# Patient Record
(demographics unavailable — no encounter records)

---

## 2024-10-16 NOTE — PHYSICAL EXAM

## 2024-10-16 NOTE — ASSESSMENT
[FreeTextEntry1] : 58 yo female for screening colonoscopy . Last colonoscopy 2019. No family hx of colon cancer screening. Due for mammograms.Hx of gastric bypass. Had endoscopic revision recently with Dr. ADAM Foreman.She denies cp, sob. THere is no change in bowel habits.  IMP:61 yo female for evaluation of alp . Last colonoscopy 2019. No family hx of colon cancer screening. Due for mammograms.Hx of gastric bypass. Had endoscopic revision recently with Dr. ADAM Foreman.She denies cp, sob. THere is no change in bowel habits. 2/24- , normal alt, ast.  RTO 10/16/24- alp 191, c/w 144 in 2/24. GGT repeatedly NORMAL. Had normal bone density 2022 201- had NM bone spect which showed mild increase in mandible degenerative changes Hx of meniscal tear, on MRI 2021. Knee sometimes swells.  Previously elevated by Dr. Plasencia IMP: 1. elevated alp, likely of bone/skeletal origin with normal ggt 2. Obesity  PLAN: 1. alp isoenzymes 2. PTH 3. If bone source confirmed , consider repeat bonescan

## 2024-10-16 NOTE — HISTORY OF PRESENT ILLNESS
[FreeTextEntry1] : 2024 normal [de-identified] : 2023 hepatomegaly and fatty liver, thickened small bowel loop

## 2024-11-08 NOTE — PHYSICAL EXAM
[General Appearance - Alert] : alert [General Appearance - In No Acute Distress] : in no acute distress [Full Pulse] : the pedal pulses are present [Edema] : there was no peripheral edema [FreeTextEntry1] : valgus deformity of the knee b/l; crepitus b/l knees; +SLR right  [Oriented To Time, Place, And Person] : oriented to person, place, and time [Impaired Insight] : insight and judgment were intact [Affect] : the affect was normal

## 2024-11-08 NOTE — HISTORY OF PRESENT ILLNESS
[FreeTextEntry1] : Presents for a follow up visit.  s/p right knee injection six months ago with good response.  Today, c/o right LE pain.  Pain is felt in the lateral aspect of the knee and travels down the leg.  Also with lower back pain.   Pt denies fever, loss of bowel/bladder control, extremity weakness, or saddle anesthesia.  No recent trauma Also with pain in the right UE and neck pain.

## 2024-11-08 NOTE — ASSESSMENT
[FreeTextEntry1] : 1. X-ray of the b/l knees following uptake on the bone scan  2. x-ray of the lumbar and cervical spine to evaluate for disc disease  continue with Tylenol for pain Pt is unable to tolerate NSAIDs 3. Consider repeat injection of VS to the knee   Pt to f/u after results  My collective time spent on today's visit was greater than 30 minutes and included: Preparation for the visit, review of the medical records, review of pertinent diagnostic studies, examination and counseling of the patient on the above diagnosis, treatment plan and prognosis, orders of diagnostic test, medications and or appropriate procedures and documentation in the medical record of today's visit.

## 2024-11-14 NOTE — HISTORY OF PRESENT ILLNESS
[FreeTextEntry1] : TRIP  is a 60 year F w/MHx non obstructive Coronary artery disease, Hepatic steatosis, HLD, GERD, Obesity who presents for preoperative cardiovascular examination prior to hysteroscopy & d/c for fibroids/PMB w/Dr. Layla Abraham ([phone], fax [phone]) tentatively on 12/3/2024. Last OV 8/21/2024. Did have c/o urinary frequency and fatigue of which resolved. Denies Ho Thromboembolism, adverse Anesthesia RXN, Pulmonary disease/NADINE, Renal Dz, bleeding d/o, use of anti PLT, nicotine use, etOH use, impaired immunity, recent use of high dose steroids. Denies chest pain, palpitations, diaphoresis, vision changes, HA, dizziness, syncope, cough, wheezing, SOB/RICHARDSON, edema, fatigue, fever, chills, infection/UTI SXS.

## 2024-12-11 NOTE — ASSESSMENT
[FreeTextEntry1] : 1. Lumbar disc disease and lumbar radiculopathy  continue with Tylenol as needed for pain  add PT to the regimen  2. Cervical erosion/spine pain  obtain MRI of the cervical spine for further evaluation of spinal erosion MRI to evaluate for presence of disc herniation as well.   OV 2-3 months, sooner PRN   My collective time spent on today's visit was greater than 30 minutes and included: Preparation for the visit, review of the medical records, review of pertinent diagnostic studies, examination and counseling of the patient on the above diagnosis, treatment plan and prognosis, orders of diagnostic test, medications and or appropriate procedures and documentation in the medical record of today's visit.

## 2024-12-11 NOTE — PHYSICAL EXAM
[Full Pulse] : the pedal pulses are present [Edema] : there was no peripheral edema [Oriented To Time, Place, And Person] : oriented to person, place, and time [Impaired Insight] : insight and judgment were intact [Affect] : the affect was normal [FreeTextEntry1] : TTP lower back

## 2024-12-11 NOTE — HISTORY OF PRESENT ILLNESS
[FreeTextEntry1] : TRIP PATEL presents for a follow up visit today.    Patient continues to have pain in the right lower extremity.  Imaging reviewed with patient in great detail.  Results as follows.   Xray knees  Right knee lateral and patellofemoral compartment joint narrowing with peripheral osteophytes. No knee effusion. Chronic ossific focus at the tibial tuberosity, sequela of Osgood Schlatter's.  Left knee lateral compartment vacuum phenomenon and small peripheral osteophytes. The joint spaces are maintained. No knee effusion.  Findings are similar in appearance compared with prior radiograph.  X-ray lumbar spine   Preservation of the vertebral body heights. Mild retrolisthesis of L4 on L5 with mild loss of intervertebral disc height at this level posteriorly. Remaining disc levels are maintained. Sacroiliac joints are intact. Partially imaged joint spaces are maintained. Surgical clips project over the lower abdomen.  x-ray cervical spine: Preservation of the vertebral body heights. Straightening of the cervical lordosis. Loss of intervertebral disc height at C6/C7 with endplate osteophyte formation. Focal notching within the anterior superior endplate of C5, favored to represent enthesopathy change versus less likely osseous erosion. Facet alignment is intact. Prevertebral soft tissues and lung apices are unremarkable.  Takes Tylenol as needed for pain.

## 2024-12-12 NOTE — PHYSICAL EXAM
[Alert] : alert [No Acute Distress] : no acute distress [Sclera] : the sclera and conjunctiva were normal [Normal Appearance] : the appearance of the neck was normal [No Respiratory Distress] : no respiratory distress [Auscultation Breath Sounds / Voice Sounds] : lungs were clear to auscultation bilaterally [Normal S1, S2] : normal S1 and S2 [Abdomen Tenderness] : non-tender [Abdomen Soft] : soft [Abnormal Walk] : normal gait [Normal Color / Pigmentation] : normal skin color and pigmentation [No Focal Deficits] : no focal deficits [Normal Affect] : the affect was normal [Normal Mood] : the mood was normal

## 2024-12-16 NOTE — HISTORY OF PRESENT ILLNESS
[FreeTextEntry1] : Ms Traore is a 60 year old woman s/p multiple weight loss surgeries including a vertical banded gastroplasty in 1992 converted to a RYGB in 1998 followed by a distalization in 2022.  She also has undergone several TORe by Dr. Reji Foreman given weight regain.  Pt presents to discuss further evaluation of biliary ductal dilation which was noted following work-up of increased alk phos since 2022 ranging between 144-199.  An MRI/MRCP revealed "Hepatic steatosis. Scattered punctate hepatic cysts. Atrophic left hepatic lobe with mild prominence of intrahepatic biliary ducts. No evidence of obstructive stone or lesion." Also noted "Diminutive left portal vein. Chronic splenic venous thrombosis with multiple collaterals surrounding the pancreatic body."  She is s/p cholecystectomy 8 yrs ago. She denies GI complaints.

## 2024-12-16 NOTE — REVIEW OF SYSTEMS
[Fever] : no fever [Chills] : no chills [Chest Pain] : no chest pain [Abdominal Pain] : no abdominal pain [FreeTextEntry7] : Patient reports bleeding ulcer 6 yrs ago due to Ibuprofen use

## 2024-12-16 NOTE — ASSESSMENT
[FreeTextEntry1] : Ms Traore is a 60 year old found to have mild biliary ductal dilation on MRCP.  We have discussed whether further investigation of these findings and endoscopic approach if necessary. Imaging reviewed with patient and discussed with Dr. Shearer. Due to her altered anatomy following gastric bypass surgery and revisions, a traditional endoscopic procedure would not be feasible.  An EDGE procedure or an enteroscopy assisted ERCP would be warranted if a stricture was noted on imaging.  Considering only a mild prominence is noted without stone or lesion, surveillance with repeat imaging and labs is warranted.  Recommendations  Surveillance MRI/MRCP in 6 months. Repeat labs in 3 months.

## 2025-02-06 NOTE — HISTORY OF PRESENT ILLNESS
[FreeTextEntry1] : RPV- missed suture [de-identified] : 60yoF last seen 8 months ago- at that time a 6mm punch was used to remove a dilated pore on L upper back. Since then, not sure if suture retained or not, but has intermittent swelling and possibly opening up of area. Today is more flat. No f/c.   Personal hx of skin cancer: no Social Hx: recently retired

## 2025-02-06 NOTE — PHYSICAL EXAM
[Alert] : alert [FreeTextEntry3] : L upper back with scar, within this is a ~0.5cm blue papulonodule Dorsal hand sat MCPs with xerosis and rough thin plaques

## 2025-02-06 NOTE — ASSESSMENT
[FreeTextEntry1] : # Favor cyst vs foreign body granuloma, L upper back - previously 6mm punch excision of dilated pore in area; since then has intermittent swelling and possibly opening up. Would favor EIC vs foreign body granuloma - may have slight overlying hypertrophic scar, offered ILK, declined - discussed excision for complete removal- refer to Dr. Hale  #Hand dermatitis, b/l hands; chronic, flare - education, counseling - gentle hand care, avoid alcohol sanitizers, use moisturizer after every hand wash, avoid wet work/use gloves when washing dishes - start clobetasol (or betamethasone/halobetasol, whichever covered) 0.05% ointment to AA BID x 2-3 weeks, SED, do not use on face, groin, armpits. Can use under cotton gloves for first week.  - liberal bland emollients- apply vaseline or cerave healing ointment to hands at night and cover with cotton gloves

## 2025-02-28 NOTE — HISTORY OF PRESENT ILLNESS
[FreeTextEntry1] : This is a 60 y.o. F w/MHx non obstructive Coronary artery disease, Hepatic steatosis, HLD, GERD, Obesity who presents for routine follow up. Last OV 11/14/2024. Patient s/p hysteroscopy & d/c for fibroids/PMB w/ Dr. Layla Abraham on 12/3/2024. Patient states that she is feeling well. Patient states that she feels well today. No complaints.   Denies chest pain, palpitations, diaphoresis, vision changes, HA, dizziness, syncope, cough, wheezing, edema, fever, chills, infection.

## 2025-03-05 NOTE — HISTORY OF PRESENT ILLNESS
[FreeTextEntry1] : 2024 normal [de-identified] : 2023 hepatomegaly and fatty liver, thickened small bowel loop

## 2025-03-05 NOTE — ASSESSMENT
[FreeTextEntry1] : 59 yo female for evaluation of alp . Last colonoscopy 2019. No family hx of colon cancer screening. Due for mammograms.Hx of gastric bypass. Had endoscopic revision recently with Dr. ADAM Foreman.She denies cp, sob. THere is no change in bowel habits. 2/24- , normal alt, ast.  RTO 10/16/24- alp 191, c/w 144 in 2/24. GGT repeatedly NORMAL. Had normal bone density 2022 201- had NM bone spect which showed mild increase in mandible degenerative changes Hx of meniscal tear, on MRI 2021. Knee sometimes swells.  Previously elevated by Dr. Plasencia  RTO 3/5/25- saw ADAM Foreman MD Had ESU,  With Dr. Tenzin Mcarthur MD 2/27/25 with measurement of PV pressure gradient which was normal at 3.75 mm hg Pending  liver bx results.  Prior to this, had consult with Dr. Maza due to biliary abnormalities- altered anatomy precludes ERCP, routinely and pt was advised to have followup with MRCP in 6months. Liver panel normal except mild elevation alp at 191 which is stable. MRCP from 11/24 reviewed with Dim left portal vein, left hepatic lobe atrophy and chronic splenic vein thrombosis.  No pruritus endorsed by pt and no etoh usage / herbal meds. Reviewed fibroscan: F0-1 S3 IMP: 1.no evidence of advanced fibrosis, on recent Fibroscan -- pending liver bx results 2. Obesity 3. gerd controlled 4. Splenic vein throbosis PLAN: 1. await liver bx results ( pt to email) 2. RTO 6months 3. MRCP 6months 4. will consider NATALIA

## 2025-03-17 NOTE — ADDENDUM
[FreeTextEntry1] : This note was written by Marimar Webb on 03/17/2025 acting as medical scribe for Dr. Denzel Sultana. I, Dr. Denzel Sultana, have read and attest that all the information, medical decision making and discharge instructions within are true and accurate.

## 2025-03-17 NOTE — HISTORY OF PRESENT ILLNESS
[FreeTextEntry1] : Ms. PATEL is a 60-year-old female who returns today for endocrine reevaluation. Patient returns with regard to a history of nodular thyroid. No prior need for fna.    Additional history includes that of chronic neck and back pain, obesity with h/o gastric bypass surgery, portal vein thrombosis, B-12 deficiency, vitamin D deficiency, pre-dm - The patient's latest A1C returned at 5.9% on labs 02/28/25.  Thyroid US from 03/30/2023 showed scattered sub cm colloid cysts up to 6 mm in rt upper pole TR-1 and a 1.3 cm isoechoic nodule in the Left upper pole Tr-3.   Latest Thyroid US from 07/02/24 showed stable low risk Tr-1 nodularity with right lobe with several sub cm cystic nodules noted and left sided isoechoic nodule stable. TSH was 0.54 on 02/28/25  Denies anterior neck discomfort, difficulty swallowing, or any change in the appearance of the neck region.  She had a liver biopsy 2 weeks ago at the [facility] in [place] and still has not received results. She follows with her GI Dr. Shearer re her liver, he believes it might be fatty liver; the patient states that she did have a sonogram. She has been following with the group in [place] since her gastric bypass; her weight was stable for a bit then began steadily increasing. Her Alk Phos was noted to be elevated which then prompted a biopsy. _________________________________________________________________________________________________ Additional medications: Atorvastatin 20 mg, Tizanidine 2 mg  Off vitamin D as of late.  Normal bone density on 10/31/24.

## 2025-03-17 NOTE — HISTORY OF PRESENT ILLNESS
[FreeTextEntry1] : Ms. PATEL is a 60-year-old female who returns today for endocrine reevaluation. Patient returns with regard to a history of nodular thyroid. No prior need for fna.    Additional history includes that of chronic neck and back pain, obesity with h/o gastric bypass surgery, portal vein thrombosis, B-12 deficiency, vitamin D deficiency, pre-dm - The patient's latest A1C returned at 5.9% on labs 02/28/25.  Thyroid US from 03/30/2023 showed scattered sub cm colloid cysts up to 6 mm in rt upper pole TR-1 and a 1.3 cm isoechoic nodule in the Left upper pole Tr-3.   Latest Thyroid US from 07/02/24 showed stable low risk Tr-1 nodularity with right lobe with several sub cm cystic nodules noted and left sided isoechoic nodule stable. TSH was 0.54 on 02/28/25  Denies anterior neck discomfort, difficulty swallowing, or any change in the appearance of the neck region.  She had a liver biopsy 2 weeks ago at the [facility] in [place] and still has not received results. She follows with her GI Dr. Shearer re her liver, he believes it might be fatty liver; the patient states that she did have a sonogram. She has been following with the group in [place] since her gastric bypass; her weight was stable for a bit then began steadily increasing. Her Alk Phos was noted to be elevated which then prompted a biopsy. _________________________________________________________________________________________________ Additional medications: Atorvastatin 20 mg, Tizanidine 2 mg  Off vitamin D as of late.  Normal bone density on 10/31/24.

## 2025-04-02 NOTE — PHYSICAL EXAM
[Normal] : supple, no neck mass and thyroid not enlarged [Normal Neck Lymph Nodes] : normal neck lymph nodes  [Normal Supraclavicular Lymph Nodes] : normal supraclavicular lymph nodes [Normal Groin Lymph Nodes] : normal groin lymph nodes [Normal Axillary Lymph Nodes] : normal axillary lymph nodes [Normal] : oriented to person, place and time, with appropriate affect [de-identified] : No masses or adenopathy bilaterally, minimal costochondral junction tenderness, mild tenderness left axillary chest wall likely musculoskeletal, ultrasound negative for any solid or cystic lesions.

## 2025-04-02 NOTE — PHYSICAL EXAM
[Normal] : supple, no neck mass and thyroid not enlarged [Normal Neck Lymph Nodes] : normal neck lymph nodes  [Normal Supraclavicular Lymph Nodes] : normal supraclavicular lymph nodes [Normal Groin Lymph Nodes] : normal groin lymph nodes [Normal Axillary Lymph Nodes] : normal axillary lymph nodes [Normal] : oriented to person, place and time, with appropriate affect [de-identified] : No masses or adenopathy bilaterally, minimal costochondral junction tenderness, mild tenderness left axillary chest wall likely musculoskeletal, ultrasound negative for any solid or cystic lesions.

## 2025-04-02 NOTE — HISTORY OF PRESENT ILLNESS
[de-identified] : Patient is a 59 y/o F who presents a chief complaint of left axillary pain, Denies any recent heavy lifting or change in physical activity. Hx removal of implants in 2020.   MMG/US 6/24/24 - Benign (TC: 12.0%, BIRADS 2)  Denies any family hx of breast cancer.

## 2025-04-02 NOTE — CONSULT LETTER
[Dear  ___] : Dear  [unfilled], [Consult Letter:] : I had the pleasure of evaluating your patient, [unfilled]. [Please see my note below.] : Please see my note below. [Sincerely,] : Sincerely, [FreeTextEntry3] : Horacio Rothman MD FACS

## 2025-04-02 NOTE — ASSESSMENT
[FreeTextEntry1] : Left axillary pain likely musculoskeletal Recommend vitamin E and/or primrose oil prn Will get yearly breast imaging now RTO 6 weeks

## 2025-04-02 NOTE — HISTORY OF PRESENT ILLNESS
[de-identified] : Patient is a 61 y/o F who presents a chief complaint of left axillary pain, Denies any recent heavy lifting or change in physical activity. Hx removal of implants in 2020.   MMG/US 6/24/24 - Benign (TC: 12.0%, BIRADS 2)  Denies any family hx of breast cancer.

## 2025-04-06 NOTE — HISTORY OF PRESENT ILLNESS
[FreeTextEntry1] : TRIP PATEL presents for a follow up visit today.   1. Neck pain--unchanged;  MRI of the cervical reviewed with patient upon another request. C5-6 disc herniation.  2. Knee Pain worsening  good response to IA VS right knee now with pain in the right as well as the left knees.  Wishes to consider another injection but would like to consider b/l knees   Xray knees  Right knee lateral and patellofemoral compartment joint narrowing with peripheral osteophytes. No knee effusion. Chronic ossific focus at the tibial tuberosity, sequela of Osgood Schlatter's.  Left knee lateral compartment vacuum phenomenon and small peripheral osteophytes. The joint spaces are maintained. No knee effusion.

## 2025-04-06 NOTE — ASSESSMENT
[FreeTextEntry1] : unable to use NSAIDs due to prior bariatric surgery worsening pain in b/l knees; excellent response to IA VS in the past.  orthovisc rx sent to the pharmacy   My collective time spent on today's visit was greater than 20 minutes and included: Preparation for the visit, review of the medical records, review of pertinent diagnostic studies, examination and counseling of the patient on the above diagnosis, treatment plan and prognosis, orders of diagnostic test, medications and or appropriate procedures and documentation in the medical record of today's visit.

## 2025-04-06 NOTE — PHYSICAL EXAM
[General Appearance - Alert] : alert [General Appearance - In No Acute Distress] : in no acute distress [FreeTextEntry1] : crepitus b/l heriberto [Oriented To Time, Place, And Person] : oriented to person, place, and time [Impaired Insight] : insight and judgment were intact [Affect] : the affect was normal

## 2025-04-16 NOTE — ASSESSMENT
[FreeTextEntry1] : 61 yo female for evaluation of alp . Last colonoscopy 2019. No family hx of colon cancer screening. Due for mammograms.Hx of gastric bypass. Had endoscopic revision recently with Dr. ADAM Foreman.She denies cp, sob. THere is no change in bowel habits. 2/24- , normal alt, ast.  RTO 10/16/24- alp 191, c/w 144 in 2/24. GGT repeatedly NORMAL. Had normal bone density 2022 201- had NM bone spect which showed mild increase in mandible degenerative changes Hx of meniscal tear, on MRI 2021. Knee sometimes swells.  Previously elevated by Dr. Plasencia  RTO 3/5/25- saw ADAM Foreman MD Had ESU,  With Dr. Tenzin Mcarthur MD 2/27/25 with measurement of PV pressure gradient which was normal at 3.75 mm hg Pending  liver bx results.  Prior to this, had consult with Dr. Maza due to biliary abnormalities- altered anatomy precludes ERCP, routinely and pt was advised to have followup with MRCP in 6months. Liver panel normal except mild elevation alp at 191 which is stable. MRCP from 11/24 reviewed with Dim left portal vein, left hepatic lobe atrophy and chronic splenic vein thrombosis.  No pruritus endorsed by pt and no etoh usage / herbal meds.  RTO 4/16/25- recent alp 191, ggt normal, alp ast normal, [facility] bmi 434[facility] / HEBER Cerrato at [facility] follows pt with Dr. Foreman  REviewed bx from [facility] : Grade 2 stage 1-2 fibrosis with 40 % macrovesicular steatosis DIscussed with pt indications for Resmetirom for NAFLD with stage 2 at least fibrosis. She is interested in pursuing. Also d/w pt need to lose weight 5-10 % body weight. IMP: 1.stage 2 fibrosis due to MASLD on liver bx 2. Obesity 3. gerd controlled 4. Splenic vein throbosis PLAN: 1. REzdiffra 100 mg daily after lab review 2. RTO 6months 3. MRCP 6months

## 2025-04-16 NOTE — HISTORY OF PRESENT ILLNESS
[FreeTextEntry1] : 2024 normal [de-identified] : 2023 hepatomegaly and fatty liver, thickened small bowel loop [de-identified] : fibroscan 2025 S3 F0-1 ( may underestimate due to obesity)

## 2025-04-16 NOTE — PHYSICAL EXAM
[Alert] : alert [Normal Voice/Communication] : normal voice/communication [Healthy Appearing] : healthy appearing [No Acute Distress] : no acute distress [Sclera] : the sclera and conjunctiva were normal [Hearing Threshold Finger Rub Not Breathitt] : hearing was normal [Normal Lips/Gums] : the lips and gums were normal [Oropharynx] : the oropharynx was normal [Normal Appearance] : the appearance of the neck was normal [No Neck Mass] : no neck mass was observed [No Respiratory Distress] : no respiratory distress [No Acc Muscle Use] : no accessory muscle use [Respiration, Rhythm And Depth] : normal respiratory rhythm and effort [Auscultation Breath Sounds / Voice Sounds] : lungs were clear to auscultation bilaterally [Heart Rate And Rhythm] : heart rate was normal and rhythm regular [Normal S1, S2] : normal S1 and S2 [Murmurs] : no murmurs [Bowel Sounds] : normal bowel sounds [Abdomen Tenderness] : non-tender [No Masses] : no abdominal mass palpated [Abdomen Soft] : soft [] : no hepatosplenomegaly [Oriented To Time, Place, And Person] : oriented to person, place, and time

## 2025-05-14 NOTE — ADDENDUM
[FreeTextEntry1] : This note was written by Toño Del Cid on 05/14/2025 acting as medical scribe for Dr. Genesis Knox. I, Dr. Genesis Knox, have read and attest that all the information, medical decision making and discharge instructions within are true and accurate.

## 2025-05-14 NOTE — HISTORY OF PRESENT ILLNESS
[FreeTextEntry1] : Left LE edema [de-identified] : Ms. PATEL is a 60 year-old female with PMHx of non obstructive Coronary artery disease, Hepatic steatosis, HLD, GERD, Obesity presenting today for Left LE edema that started after new medication, and plane ride on sunday. Denies chest pain, sob, mcpherson, dizziness, diaphoresis, palpitations, LE swelling, orthopnea, syncope, n/v, headache.

## 2025-06-06 NOTE — DISCUSSION/SUMMARY
[de-identified] : This patient has right knee oa and left knee pain likely from overload from the right. An extensive discussion was conducted on the natural history of the disease and the variety of surgical and non-surgical options available to the patient including, but not limited to non-steroidal anti-inflammatory medications, steroid injections, physical therapy, maintenance of ideal body weight, and reduction of activity. I recommend mobic but she cannot take this because of hx GIB. I recommend tylenol but she was recently dx w DELFINO. I recommend CSI but she prefers to avoid injections at this time. Weight loss recommended. PT rx. The patient will schedule an appointment as needed.

## 2025-06-06 NOTE — HISTORY OF PRESENT ILLNESS
[de-identified] : This is very nice 60 year-old woman experiencing R>>L knee pain, which is severe in intensity. The pain substantially limits activities of daily living. Walking tolerance is reduced. Sometimes keeps her from sleeping. Not taking NSAIDs. Not using a cane. Cannot take nsaids bc bleeding ulcer hx. Pain and restriction of function are intolerable at this time.

## 2025-06-06 NOTE — PHYSICAL EXAM
[de-identified] : Patient is well nourished, well-developed, in no acute distress, with appropriate mood and affect. The patient is oriented to time, place, and person. Gait evaluation does reveal a limp. There is no inguinal adenopathy. Bilateral limbs are well-perfused, without skin lesions, shows a grossly normal motor and sensory examination. The right knee motion is significantly reduced and does cause significant pain. The right knee moves from 0-115 degrees. The knee is stable within that range-of-motion to AP and ML stress. The alignment of the knee is 5 deg valgus. Muscle strength is normal. Pedal pulses are palpable. Hip examination was negative. The left knee motion is painless and full and moves from 0-135 degrees. 5 deg valgus. The knee is stable within that range-of-motion to AP and ML stress. Muscle strength is normal. Pedal pulses are palpable. Hip examination was negative.   [de-identified] : Long standing knee, AP knee, lateral knee, and patellar views of the right knee were ordered and taken in the office and demonstrate degenerative joint disease of the knee with joint space narrowing, osteophyte formation, and subchondral sclerosis.  Long standing knee, AP knee, lateral knee, and patellar views of the left knee were ordered and taken in the office and demonstrate no evidence of joint disease of the knee with maintained joint space.

## 2025-07-09 NOTE — ASSESSMENT
[FreeTextEntry1] : 59 yo female for evaluation of alp . Last colonoscopy 2019. No family hx of colon cancer screening. Due for mammograms.Hx of gastric bypass. Had endoscopic revision recently with Dr. ADAM Foreman.She denies cp, sob. THere is no change in bowel habits. 2/24- , normal alt, ast.  RTO 10/16/24- alp 191, c/w 144 in 2/24. GGT repeatedly NORMAL. Had normal bone density 2022 201- had NM bone spect which showed mild increase in mandible degenerative changes Hx of meniscal tear, on MRI 2021. Knee sometimes swells.  Previously elevated by Dr. Plasencia  RTO 3/5/25- saw ADAM Foreman MD Had ESU,  With Dr. Tenzin Mcarthur MD 2/27/25 with measurement of PV pressure gradient which was normal at 3.75 mm hg Pending  liver bx results.  Prior to this, had consult with Dr. Maza due to biliary abnormalities- altered anatomy precludes ERCP, routinely and pt was advised to have followup with MRCP in 6months. Liver panel normal except mild elevation alp at 191 which is stable. MRCP from 11/24 reviewed with Dim left portal vein, left hepatic lobe atrophy and chronic splenic vein thrombosis.  No pruritus endorsed by pt and no etoh usage / herbal meds.  RTO 4/16/25- recent alp 191, ggt normal, alp ast normal, recent bmi 434, [facility] to put pt on rezdiffra 100 mg for fatty liver. / DAVIDLD  HEBER Salazar at [facility] follows pt with Dr. Foreman  REviewed bx from [facility] : Grade 2 stage 1-2 fibrosis with 40 % macrovesicular steatosis DIscussed with pt indications for Resmetirom for NAFLD with stage 2 at least fibrosis. She is interested in pursuing. Also d/w pt need to lose weight 5-10 % body weight.  RTO  7/925 recent labs from 6/23/25: AST 58, alt 88, alp 164, june 4 2025, alp 185,  alt 112 from May alt 86 and alp 228. Currently on Rezdiffra for MASLD. as d/w pt, expect trending downwards of ast, alt. Need to followup TSH and free t4. IMP: 1.stage 2 fibrosis due to MASLD on liver bx 2. Obesity 3. gerd controlled 4. Splenic vein throbosis PLAN: 1. REzdiffra 100 mg daily after lab review 2. RTO 4months 3. MRCP 4months 4. cbc, cmp, tfts

## 2025-07-09 NOTE — HISTORY OF PRESENT ILLNESS
[FreeTextEntry1] : 2024 normal [de-identified] : 2023 hepatomegaly and fatty liver, thickened small bowel loop [de-identified] : fibroscan 2025 S3 F0-1 ( may underestimate due to obesity)

## 2025-07-09 NOTE — REVIEW OF SYSTEMS
[As Noted in HPI] : as noted in HPI [Joint Swelling] : joint swelling [Joint Stiffness] : joint stiffness [Negative] : Heme/Lymph [Recent Weight Loss (___ Lbs)] : recent [unfilled] ~Ulb weight loss